# Patient Record
Sex: FEMALE | Race: WHITE | NOT HISPANIC OR LATINO | Employment: STUDENT | URBAN - METROPOLITAN AREA
[De-identification: names, ages, dates, MRNs, and addresses within clinical notes are randomized per-mention and may not be internally consistent; named-entity substitution may affect disease eponyms.]

---
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
## 2024-03-14 ENCOUNTER — OFFICE VISIT (OUTPATIENT)
Dept: FAMILY MEDICINE | Facility: CLINIC | Age: 19
End: 2024-03-14
Payer: COMMERCIAL

## 2024-03-14 VITALS
HEART RATE: 60 BPM | OXYGEN SATURATION: 98 % | BODY MASS INDEX: 22.33 KG/M2 | WEIGHT: 134 LBS | DIASTOLIC BLOOD PRESSURE: 83 MMHG | TEMPERATURE: 98.3 F | RESPIRATION RATE: 20 BRPM | HEIGHT: 65 IN | SYSTOLIC BLOOD PRESSURE: 122 MMHG

## 2024-03-14 DIAGNOSIS — J32.0 CHRONIC MAXILLARY SINUSITIS: ICD-10-CM

## 2024-03-14 DIAGNOSIS — H65.93 BILATERAL NON-SUPPURATIVE OTITIS MEDIA: Primary | ICD-10-CM

## 2024-03-14 PROCEDURE — 99213 OFFICE O/P EST LOW 20 MIN: CPT

## 2024-03-14 ASSESSMENT — ENCOUNTER SYMPTOMS
SORE THROAT: 1
COUGH: 1

## 2024-03-14 NOTE — PROGRESS NOTES
Assessment & Plan     Bilateral non-suppurative otitis media  Erythema to tympanic membranes bilaterally, no purulent drainage noted.  Patient has not had fevers.  Patient has not had a history of frequent ear infections, she did have frequent ear infections as a child.  Discussed recent upper respiratory infection may have contributed to the development of otitis media.  - amoxicillin-clavulanate (AUGMENTIN) 875-125 MG tablet; Take 1 tablet by mouth 2 times daily for 7 days  - Adult ENT  Referral; Future    Chronic maxillary sinusitis  Patient has had chronic maxillary sinusitis.  She does use nasal saline lavages, she uses occasional nasal steroids.  She does not use frequent nasal decongestant we discussed risk of rhinitis medicamentosa.  She has not previously seen ENT.  She does have seasonal allergies and is advised to start taking it daily allergy medication.  Referral to ENT for further evaluation of sinuses with recurrent infections.  - Adult ENT  Referral; Future              Subjective   Casandra is a 18 year old, presenting for the following health issues:  Pharyngitis, Otalgia (Pt c/o ST, Bilateral ear pain and productive cough x 3 days), and Cough      3/14/2024     1:45 PM   Additional Questions   Roomed by Santi BELTRAN     History of Present Illness       Reason for visit:  Ear pain, congestion, sore throat  Symptom onset:  3-7 days ago  Symptom intensity:  Moderate  Symptom progression:  Worsening  Had these symptoms before:  Yes  Has tried/received treatment for these symptoms:  Yes  Previous treatment was successful:  Yes  Prior treatment description:  Medication    She eats 2-3 servings of fruits and vegetables daily.She consumes 2 sweetened beverage(s) daily.She exercises with enough effort to increase her heart rate 30 to 60 minutes per day.  She exercises with enough effort to increase her heart rate 4 days per week. She is missing 2 dose(s) of medications per week.    "                  Objective    /83 (BP Location: Right arm, Patient Position: Sitting, Cuff Size: Adult Regular)   Pulse 60   Temp 98.3  F (36.8  C) (Tympanic)   Resp 20   Ht 1.645 m (5' 4.75\")   Wt 60.8 kg (134 lb)   SpO2 98%   BMI 22.47 kg/m    Body mass index is 22.47 kg/m .  Physical Exam  HENT:      Right Ear: Ear canal normal. Tympanic membrane is erythematous. Tympanic membrane is not perforated.      Left Ear: Ear canal normal. Tympanic membrane is erythematous. Tympanic membrane is not perforated.      Mouth/Throat:      Mouth: Mucous membranes are moist.      Pharynx: Oropharynx is clear.   Cardiovascular:      Rate and Rhythm: Normal rate.   Pulmonary:      Effort: Pulmonary effort is normal.   Lymphadenopathy:      Cervical: No cervical adenopathy.   Skin:     General: Skin is warm and dry.      Capillary Refill: Capillary refill takes less than 2 seconds.   Neurological:      Mental Status: She is alert and oriented to person, place, and time.                    Signed Electronically by: SHAE Carmona CNP    "

## 2024-03-28 ENCOUNTER — TELEPHONE (OUTPATIENT)
Dept: FAMILY MEDICINE | Facility: CLINIC | Age: 19
End: 2024-03-28
Payer: COMMERCIAL

## 2024-03-28 DIAGNOSIS — B37.31 CANDIDAL VULVOVAGINITIS: Primary | ICD-10-CM

## 2024-03-28 RX ORDER — FLUCONAZOLE 150 MG/1
150 TABLET ORAL ONCE
Qty: 1 TABLET | Refills: 0 | Status: SHIPPED | OUTPATIENT
Start: 2024-03-28 | End: 2024-03-28

## 2024-03-28 NOTE — TELEPHONE ENCOUNTER
Patient was seen by Raine Mcgee on 3.16.24 for an ear infection. Patient was given Augmentin for the infection and has since developed a yeast infection as a result of the augmentin.    Mom is calling on behalf of patient and would like something called in to [address] [phone] for the yeast infection.    Unable to send to RN for triage as patients mom was calling in behalf of patient.

## 2024-03-28 NOTE — TELEPHONE ENCOUNTER
Mom calling for patient stating she has yeast infection from taking Augmentin for ear infection. Seen in clinic with Raine Mcgee on 3/14/24. Please advise.     From OV 3/14/24.

## 2024-03-28 NOTE — TELEPHONE ENCOUNTER
Called preferred number and spoke with mom.  Mom has permission and shared to discuss patient health information.  Mom provides that patient has had symptoms of yeast infection after taking antibiotics, mom provided information that patient has not previously had yeast infections.  Reported to mom will send prescription for fluconazole to be taken once orally to preferred pharmacy and patient to follow-up if not improved.    SHAE Carmona CNP on 3/28/2024 at 11:43 AM

## 2024-05-07 ENCOUNTER — PATIENT OUTREACH (OUTPATIENT)
Dept: CARE COORDINATION | Facility: CLINIC | Age: 19
End: 2024-05-07
Payer: COMMERCIAL

## 2024-05-21 ENCOUNTER — PATIENT OUTREACH (OUTPATIENT)
Dept: CARE COORDINATION | Facility: CLINIC | Age: 19
End: 2024-05-21
Payer: COMMERCIAL

## 2024-06-25 DIAGNOSIS — F41.1 GAD (GENERALIZED ANXIETY DISORDER): ICD-10-CM

## 2024-06-26 RX ORDER — ESCITALOPRAM OXALATE 10 MG/1
10 TABLET ORAL DAILY
Qty: 90 TABLET | Refills: 0 | Status: SHIPPED | OUTPATIENT
Start: 2024-06-26

## 2024-06-26 NOTE — TELEPHONE ENCOUNTER
Mom notified that Dr. Peterson would like to see patient for follow up. She will have patient call to get scheduled.  Grecia Montes CMA ............... 3:06 PM, 06/26/24

## 2024-07-08 DIAGNOSIS — Z30.41 ORAL CONTRACEPTIVE USE: Primary | ICD-10-CM

## 2024-07-08 RX ORDER — LEVONORGESTREL AND ETHINYL ESTRADIOL 100-20(84)
1 KIT ORAL DAILY
Qty: 91 TABLET | Refills: 3 | Status: SHIPPED | OUTPATIENT
Start: 2024-07-08

## 2024-11-13 ENCOUNTER — OFFICE VISIT (OUTPATIENT)
Dept: FAMILY MEDICINE | Facility: CLINIC | Age: 19
End: 2024-11-13
Payer: COMMERCIAL

## 2024-11-13 VITALS
WEIGHT: 152.7 LBS | OXYGEN SATURATION: 97 % | BODY MASS INDEX: 25.44 KG/M2 | HEART RATE: 96 BPM | SYSTOLIC BLOOD PRESSURE: 120 MMHG | TEMPERATURE: 97.2 F | RESPIRATION RATE: 18 BRPM | DIASTOLIC BLOOD PRESSURE: 82 MMHG | HEIGHT: 65 IN

## 2024-11-13 DIAGNOSIS — R14.0: ICD-10-CM

## 2024-11-13 DIAGNOSIS — F41.1 GAD (GENERALIZED ANXIETY DISORDER): Primary | ICD-10-CM

## 2024-11-13 PROCEDURE — 86364 TISS TRNSGLTMNASE EA IG CLAS: CPT | Performed by: PHYSICIAN ASSISTANT

## 2024-11-13 PROCEDURE — 36415 COLL VENOUS BLD VENIPUNCTURE: CPT | Performed by: PHYSICIAN ASSISTANT

## 2024-11-13 PROCEDURE — 99213 OFFICE O/P EST LOW 20 MIN: CPT | Performed by: PHYSICIAN ASSISTANT

## 2024-11-13 RX ORDER — ESCITALOPRAM OXALATE 10 MG/1
10 TABLET ORAL DAILY
Qty: 30 TABLET | Refills: 5 | Status: SHIPPED | OUTPATIENT
Start: 2024-11-13

## 2024-11-13 ASSESSMENT — ANXIETY QUESTIONNAIRES
GAD7 TOTAL SCORE: 4
5. BEING SO RESTLESS THAT IT IS HARD TO SIT STILL: NOT AT ALL
GAD7 TOTAL SCORE: 4
2. NOT BEING ABLE TO STOP OR CONTROL WORRYING: SEVERAL DAYS
IF YOU CHECKED OFF ANY PROBLEMS ON THIS QUESTIONNAIRE, HOW DIFFICULT HAVE THESE PROBLEMS MADE IT FOR YOU TO DO YOUR WORK, TAKE CARE OF THINGS AT HOME, OR GET ALONG WITH OTHER PEOPLE: SOMEWHAT DIFFICULT
7. FEELING AFRAID AS IF SOMETHING AWFUL MIGHT HAPPEN: NOT AT ALL
4. TROUBLE RELAXING: NOT AT ALL
1. FEELING NERVOUS, ANXIOUS, OR ON EDGE: SEVERAL DAYS
6. BECOMING EASILY ANNOYED OR IRRITABLE: SEVERAL DAYS
8. IF YOU CHECKED OFF ANY PROBLEMS, HOW DIFFICULT HAVE THESE MADE IT FOR YOU TO DO YOUR WORK, TAKE CARE OF THINGS AT HOME, OR GET ALONG WITH OTHER PEOPLE?: SOMEWHAT DIFFICULT
7. FEELING AFRAID AS IF SOMETHING AWFUL MIGHT HAPPEN: NOT AT ALL
GAD7 TOTAL SCORE: 4
3. WORRYING TOO MUCH ABOUT DIFFERENT THINGS: SEVERAL DAYS

## 2024-11-13 ASSESSMENT — PATIENT HEALTH QUESTIONNAIRE - PHQ9
SUM OF ALL RESPONSES TO PHQ QUESTIONS 1-9: 2
SUM OF ALL RESPONSES TO PHQ QUESTIONS 1-9: 2
10. IF YOU CHECKED OFF ANY PROBLEMS, HOW DIFFICULT HAVE THESE PROBLEMS MADE IT FOR YOU TO DO YOUR WORK, TAKE CARE OF THINGS AT HOME, OR GET ALONG WITH OTHER PEOPLE: NOT DIFFICULT AT ALL

## 2024-11-13 NOTE — PROGRESS NOTES
"  Assessment & Plan     (F41.1) BRENDA (generalized anxiety disorder)  (primary encounter diagnosis)  Comment: Flare right now  Plan: escitalopram (LEXAPRO) 10 MG tablet        Get back on Lexapro    (R14.0) Functional abdominal bloating after eating certain foods  Comment: [place] celiac disease versus gluten intolerance  Plan: Tissue transglutaminase manda IgA and IgG        Schedule with Deanna          BMI  Estimated body mass index is 25.61 kg/m  as calculated from the following:    Height as of this encounter: 1.645 m (5' 4.75\").    Weight as of this encounter: 69.3 kg (152 lb 11.2 oz).             Ivy Guajardo is a 18 year old, presenting for the following health issues:   Follow Up (Discuss restarting anxiety medication )      11/13/2024     4:08 PM   Additional Questions   Roomed by RUBY Chirinos   Accompanied by mom- Spring     18-year-old here with mom to discuss anxiety  She has been on Lexapro in the past is not currently taking Lexapro  Classes are going well  Work is going well  Home was going well  She is not sure why she is more anxious  She has been off of this for a few months  She tolerated it and it worked well  No thoughts of self or other harm  Other concern is when she eats Pasta or bread she has abdominal bloating and sometimes will have forceful diarrhea with relief of her symptoms or emesis there is some paternal history of gluten sensitivity and 1 aunt with celiac disease    History of Present Illness       Mental Health Follow-up:  Patient presents to follow-up on Anxiety.    Patient's anxiety since last visit has been:  Medium  The patient is not having other symptoms associated with anxiety.  Any significant life events: No  Patient is feeling anxious or having panic attacks.  Patient has no concerns about alcohol or drug use.    Reason for visit:  Medication and stomach pain    She eats 2-3 servings of fruits and vegetables daily.She consumes 1 sweetened beverage(s) daily.She exercises " with enough effort to increase her heart rate 30 to 60 minutes per day.  She exercises with enough effort to increase her heart rate 4 days per week.   She is taking medications regularly.     PATIENT HEALTH QUESTIONNAIRE-9 (PHQ - 9)    Over the last 2 weeks, how often have you been bothered by any of the following problems?    1. Little interest or pleasure in doing things -  (Patient-Rptd) Not at all   2. Feeling down, depressed, or hopeless -  (Patient-Rptd) Not at all   3. Trouble falling or staying asleep, or sleeping too much - (Patient-Rptd) Not at all   4. Feeling tired or having little energy -  (Patient-Rptd) Several days   5. Poor appetite or overeating -  (Patient-Rptd) Not at all   6. Feeling bad about yourself - or that you are a failure or have let yourself or your family down -  (Patient-Rptd) Not at all   7. Trouble concentrating on things, such as reading the newspaper or watching television - (Patient-Rptd) Several days   8. Moving or speaking so slowly that other people could have noticed? Or the opposite - being so fidgety or restless that you have been moving around a lot more than usual (Patient-Rptd) Not at all   9. Thoughts that you would be better off dead or of hurting  yourself in some way (Patient-Rptd) Not at all   Total Score: (Patient-Rptd) 2     If you checked off any problems, how difficult have these problems made it for you to do your work, take care of things at home, or get along with other people?      Developed by Shaquille Rutherford, Chaparrita Zelaya, Marc Martin and colleagues, with an educational ivan from [employer] No permission required to reproduce, translate, display or distribute. permission required to reproduce, translate, display or distribute.     GAD7 score: 4                     Objective    /82 (BP Location: Right arm, Patient Position: Sitting, Cuff Size: Adult Regular)   Pulse 96   Temp 97.2  F (36.2  C) (Tympanic)   Resp 18   Ht 1.645 m  "(5' 4.75\")   Wt 69.3 kg (152 lb 11.2 oz)   LMP 11/12/2024 (Exact Date)   SpO2 97%   BMI 25.61 kg/m    Body mass index is 25.61 kg/m .  Physical Exam attentive no acute distress  Mood and affect normal  Dressed and groomed appropriately  Lungs clear  Cardiovascular and rhythm  Abdomen NABS soft minimal epigastric tenderness no guarding no rebound no hepatosplenomegaly              Signed Electronically by: ELADIO Nascimento    "

## 2024-11-14 LAB
TTG IGA SER-ACNC: 0.2 U/ML
TTG IGG SER-ACNC: <0.6 U/ML

## 2024-11-30 ENCOUNTER — HEALTH MAINTENANCE LETTER (OUTPATIENT)
Age: 19
End: 2024-11-30

## 2024-12-03 ENCOUNTER — PATIENT OUTREACH (OUTPATIENT)
Dept: CARE COORDINATION | Facility: CLINIC | Age: 19
End: 2024-12-03
Payer: COMMERCIAL

## 2024-12-05 ENCOUNTER — OFFICE VISIT (OUTPATIENT)
Dept: EDUCATION SERVICES | Facility: CLINIC | Age: 19
End: 2024-12-05
Attending: PHYSICIAN ASSISTANT
Payer: COMMERCIAL

## 2024-12-05 VITALS — HEIGHT: 65 IN | WEIGHT: 150.1 LBS | BODY MASS INDEX: 25.01 KG/M2

## 2024-12-05 DIAGNOSIS — R14.0: Primary | ICD-10-CM

## 2024-12-05 NOTE — PROGRESS NOTES
"Medical Nutrition Therapy  Visit Type:Initial assessment and intervention    Casandra Blas presents today for MNT and education related to abdominal symptoms associated with food take including but not limited to bloating, gas, diarrhea and pain.   She is accompanied by mother.     ASSESSMENT:   Patient comments/concerns relating to nutrition: Patient reports very often having diarrhea/need for BM quickly after eating breads, pasta, oriental, high-fat foods.  Patient is looking for some type of nutrition recommendations and is motivated to feel better.  Symptoms have not affected studies at college but can affect social life.  Family history of IBS on maternal side and celiac disease and gluten intolerance on paternal side  NUTRITION HISTORY:  Patient is currently living at home with family who prepares well-balanced meals.  A.m. breakfast muffin or yogurt and fruit, lunch may be going out to eat more often evening meal protein, vegetable and starch.  Previous diet education: No    Food allergies/intolerances: No known    EXERCISE: Works at a [employer] and is quite active there is no set exercise routine at this time due to studies  SOCIO/ECONOMIC:   Lives with: mother, father, and brother    MEDICATIONS:  Current Outpatient Medications   Medication Sig Dispense Refill    escitalopram (LEXAPRO) 10 MG tablet Take 1 tablet (10 mg) by mouth daily. 30 tablet 5     No current facility-administered medications for this visit.       LABS:  No results found for: \"NA\"   No results found for: \"POTASSIUM\"  No results found for: \"CHLORIDE\"  No results found for: \"HIGINIO\"  No results found for: \"CO2\"  No results found for: \"BUN\"  No results found for: \"CR\"  No results found for: \"GLC\"  No results found for: \"LDL\"  No results found for: \"HDL\"]  No results found for: \"GFRESTIMATED\"  No results found for: \"CR\"  No results found for: \"MICROALBUMIN\"    ANTHROPOMETRICS:  Vitals: Ht 1.645 m (5' 4.75\")   Wt 68.1 kg (150 lb 1.6 oz)   LMP " 11/12/2024 (Exact Date)   BMI 25.17 kg/m    Body mass index is 25.17 kg/m .      Wt Readings from Last 5 Encounters:   12/05/24 68.1 kg (150 lb 1.6 oz) (80%, Z= 0.84)*   11/13/24 69.3 kg (152 lb 11.2 oz) (82%, Z= 0.92)*   03/14/24 60.8 kg (134 lb) (62%, Z= 0.31)*   08/08/23 58.2 kg (128 lb 3.2 oz) (55%, Z= 0.13)*   07/12/23 58.5 kg (129 lb) (57%, Z= 0.17)*     * Growth percentiles are based on CDC (Girls, 2-20 Years) data.     NUTRITION DIAGNOSIS: Food- and nutrition-related knowledge deficit related to therapeutic dietary recommendations as evidenced by GI symptoms    NUTRITION INTERVENTION:  MNT education completed for FODMAP elimination diet.  Explanation about what are FODMAPs and why foods high in FODMAPs trigger symptoms.    Low - FODMAP Nutrition Therapy   Tips: How to keep a food record and symptoms:   Elimination of High - FODMAP sources for 6 weeks with slow reintroduction of a high-FODMAP food of 2 per week with a couple days of rest in-between the days.  Continue with the diary of food and symptoms.    Food/ fluid Recommended  Food/ fluid to avoid   Sample menus - cookbook and recipes available online  Low FODMAP smoothies, power bowls, and salad dressing recipes provided  Grocery shopping list to follow   Pt was able verbalize understanding.  Anticipate excellent compliance.      Plan:   Follow FODMAP elimination diet x 6 weeks  After 6 weeks start with slow reintroduction of a high-FODMAP food of 2 choices only per week with a couple days of rest in-between the days.    Keep a diary of food and symptoms.      PATIENT'S BEHAVIOR CHANGE GOALS:   See Patient Instructions for patient stated behavior change goals. AVS was printed and given to patient at today's appointment.    MONITOR / EVALUATE:  RD will monitor/evaluate:  GI symptoms associated with intake    FOLLOW-UP:  Follow up with RD as needed.  Time spent in minutes: 30  Encounter: Individual

## 2024-12-05 NOTE — LETTER
"    12/5/2024         RE: Casandra Blas  [address] [facility] [address]        Dear Colleague,    Thank you for referring your patient, Casandra Blas, to the [facility]. Please see a copy of my visit note below.    Medical Nutrition Therapy  Visit Type:Initial assessment and intervention    aCsandra Blas presents today for MNT and education related to abdominal symptoms associated with food take including but not limited to bloating, gas, diarrhea and pain.   She is accompanied by mother.     ASSESSMENT:   Patient comments/concerns relating to nutrition: Patient reports very often having diarrhea/need for BM quickly after eating breads, pasta, oriental, high-fat foods.  Patient is looking for some type of nutrition recommendations and is motivated to feel better.  Symptoms have not affected studies at college but can affect social life.  Family history of IBS on maternal side and celiac disease and gluten intolerance on paternal side  NUTRITION HISTORY:  Patient is currently living at home with family who prepares well-balanced meals.  A.m. breakfast muffin or yogurt and fruit, lunch may be going out to eat more often evening meal protein, vegetable and starch.  Previous diet education: No    Food allergies/intolerances: No known    EXERCISE: Works at a  and is quite active there is no set exercise routine at this time due to studies  SOCIO/ECONOMIC:   Lives with: mother, father, and brother    MEDICATIONS:  Current Outpatient Medications   Medication Sig Dispense Refill     escitalopram (LEXAPRO) 10 MG tablet Take 1 tablet (10 mg) by mouth daily. 30 tablet 5     No current facility-administered medications for this visit.       LABS:  No results found for: \"NA\"   No results found for: \"POTASSIUM\"  No results found for: \"CHLORIDE\"  No results found for: \"HIGINIO\"  No results found for: \"CO2\"  No results found for: \"BUN\"  No results found for: \"CR\"  No results found " "for: \"GLC\"  No results found for: \"LDL\"  No results found for: \"HDL\"]  No results found for: \"GFRESTIMATED\"  No results found for: \"CR\"  No results found for: \"MICROALBUMIN\"    ANTHROPOMETRICS:  Vitals: Ht 1.645 m (5' 4.75\")   Wt 68.1 kg (150 lb 1.6 oz)   LMP 11/12/2024 (Exact Date)   BMI 25.17 kg/m    Body mass index is 25.17 kg/m .      Wt Readings from Last 5 Encounters:   12/05/24 68.1 kg (150 lb 1.6 oz) (80%, Z= 0.84)*   11/13/24 69.3 kg (152 lb 11.2 oz) (82%, Z= 0.92)*   03/14/24 60.8 kg (134 lb) (62%, Z= 0.31)*   08/08/23 58.2 kg (128 lb 3.2 oz) (55%, Z= 0.13)*   07/12/23 58.5 kg (129 lb) (57%, Z= 0.17)*     * Growth percentiles are based on CDC (Girls, 2-20 Years) data.     NUTRITION DIAGNOSIS: Food- and nutrition-related knowledge deficit related to therapeutic dietary recommendations as evidenced by GI symptoms    NUTRITION INTERVENTION:  MNT education completed for FODMAP elimination diet.  Explanation about what are FODMAPs and why foods high in FODMAPs trigger symptoms.    Low - FODMAP Nutrition Therapy   Tips: How to keep a food record and symptoms:   Elimination of High - FODMAP sources for 6 weeks with slow reintroduction of a high-FODMAP food of 2 per week with a couple days of rest in-between the days.  Continue with the diary of food and symptoms.    Food/ fluid Recommended  Food/ fluid to avoid   Sample menus - cookbook and recipes available online  Low FODMAP smoothies, power bowls, and salad dressing recipes provided  Grocery shopping list to follow   Pt was able verbalize understanding.  Anticipate excellent compliance.      Plan:   Follow FODMAP elimination diet x 6 weeks  After 6 weeks start with slow reintroduction of a high-FODMAP food of 2 choices only per week with a couple days of rest in-between the days.    Keep a diary of food and symptoms.      PATIENT'S BEHAVIOR CHANGE GOALS:   See Patient Instructions for patient stated behavior change goals. AVS was printed and given to " patient at today's appointment.    MONITOR / EVALUATE:  RD will monitor/evaluate:  GI symptoms associated with intake    FOLLOW-UP:  Follow up with RD as needed.  Time spent in minutes: 30  Encounter: Individual